# Patient Record
Sex: MALE
[De-identification: names, ages, dates, MRNs, and addresses within clinical notes are randomized per-mention and may not be internally consistent; named-entity substitution may affect disease eponyms.]

---

## 2017-12-20 ENCOUNTER — HOSPITAL ENCOUNTER (EMERGENCY)
Dept: HOSPITAL 14 - H.ER | Age: 47
Discharge: HOME | End: 2017-12-20
Payer: MEDICAID

## 2017-12-20 VITALS
TEMPERATURE: 97.5 F | SYSTOLIC BLOOD PRESSURE: 127 MMHG | RESPIRATION RATE: 16 BRPM | OXYGEN SATURATION: 99 % | DIASTOLIC BLOOD PRESSURE: 80 MMHG | HEART RATE: 79 BPM

## 2017-12-20 VITALS — BODY MASS INDEX: 29.9 KG/M2

## 2017-12-20 DIAGNOSIS — I12.9: ICD-10-CM

## 2017-12-20 DIAGNOSIS — F32.9: ICD-10-CM

## 2017-12-20 DIAGNOSIS — M25.562: Primary | ICD-10-CM

## 2017-12-20 DIAGNOSIS — Z88.0: ICD-10-CM

## 2017-12-20 DIAGNOSIS — F41.9: ICD-10-CM

## 2017-12-20 DIAGNOSIS — Z87.442: ICD-10-CM

## 2017-12-20 PROCEDURE — 73562 X-RAY EXAM OF KNEE 3: CPT

## 2017-12-20 PROCEDURE — 29530 STRAPPING OF KNEE: CPT

## 2017-12-20 PROCEDURE — 96372 THER/PROPH/DIAG INJ SC/IM: CPT

## 2017-12-20 PROCEDURE — 99283 EMERGENCY DEPT VISIT LOW MDM: CPT

## 2017-12-20 NOTE — RAD
PROCEDURE:  Left Knee Radiographs.



HISTORY:

Pain.



COMPARISON:

None.



FINDINGS:



BONES:

Normal. No fracture. 



JOINTS:

Normal. No osteoarthritis. 



JOINT EFFUSION:

None. 



OTHER FINDINGS:

None.



IMPRESSION:

Normal radiographs of the left knee.

## 2017-12-20 NOTE — ED PDOC
Lower Extremity Pain/Injury


Time Seen by Provider: 12/20/17 15:56


Chief Complaint (Nursing): Lower Extremity Problem/Injury


Chief Complaint (Provider): Left knee pain


History Per: Patient


History/Exam Limitations: no limitations


Current Symptoms Are (Timing): Still Present


Additional Complaint(s): 





46 y/o male presents to the emergency department with left knee pain that 

worsens with bending of the pain for 3 days. Reports pain improves when he leg 

is straight. States he took Ibuprofen 800 mg and had not experienced similar 

symptoms in the past. Denies any fall or injury. 





PMD: Dr. Sriram Ny MD





Past Medical History


Reviewed: Historical Data, Nursing Documentation, Vital Signs


Vital Signs: 





 Last Vital Signs











Temp  97.5 F L  12/20/17 15:43


 


Pulse  79   12/20/17 15:43


 


Resp  16   12/20/17 15:43


 


BP  127/80   12/20/17 15:43


 


Pulse Ox  99   12/20/17 15:43














- Medical History


PMH: Anxiety, Back Problems, Depression, Gastritis, HTN, Kidney Stones, Chronic 

Kidney Disease





- Surgical History


Other surgeries: Kindey surgery





- Family History


Family History: States: Unknown Family Hx





- Social History


Current smoker - smoking cessation education provided: No


Alcohol: None


Drugs: Denies





- Home Medications


Home Medications: 


 Ambulatory Orders











 Medication  Instructions  Recorded


 


Alprazolam 0.5 mg PO DAILY 06/12/15


 


Atenolol 25 mg PO DAILY 06/12/15


 


Multivitamin [Multiple Vitamins 1 tab PO DAILY 06/12/15





For Kids]  


 


Vortioxetine Hydrobromide 10 mg PO DAILY 06/15/15





[Brintellix]  


 


Cyclobenzaprine HCl [Flexeril] 10 mg PO BID PRN #12 tab 10/18/15


 


Ibuprofen [Motrin Tab] 800 mg PO Q6H PRN #20 tab 10/18/15


 


Ibuprofen [Motrin Tab] 800 mg PO Q8 PRN #20 tab 12/20/17














- Allergies


Allergies/Adverse Reactions: 


 Allergies











Allergy/AdvReac Type Severity Reaction Status Date / Time


 


Penicillins Allergy  RASH Verified 12/20/17 15:43














Review of Systems


ROS Statement: Except As Marked, All Systems Reviewed And Found Negative (As 

per HPI, otherwise negative)


Constitutional: Negative for: Other (No fall or injury)


Musculoskeletal: Positive for: Other (Left knee pain and swelling)





Physical Exam





- Reviewed


Nursing Documentation Reviewed: Yes


Vital Signs Reviewed: Yes





- Physical Exam


Appears: Positive for: Non-toxic, No Acute Distress


Head Exam: Positive for: ATRAUMATIC, NORMAL INSPECTION, NORMOCEPHALIC


Skin: Positive for: Normal Color, Warm, Dry


Extremity: Positive for: Normal ROM (Full range of motion of the left knee), 

Tenderness (to the left patella region), Swelling (Localized swelling to the 

left patella region).  Negative for: Other (No warmth or evidence of possible 

infection. )


Neurologic/Psych: Positive for: Alert, Oriented (x3)





- ECG


O2 Sat by Pulse Oximetry: 99 (RA)


Pulse Ox Interpretation: Normal





- Other Rad


  ** Left knee x-ray


X-Ray: Interpreted by Me, Viewed By Me


X-Ray Interpretation: no fx, no dis





Medical Decision Making


Medical Decision Making: 


Time: 1613





Initial impression: 46 y/o male with left knee pain 





Plan: 


X-ray left knee


PO motrin





Patient is aware of x-ray results. He reports improvement of pain after 

medication administered. Patient was given knee immobilizer, crutches declined. 

Prescription provided for Motrin, orthopedic referral also provided for follow-

up.





Scribe Attestation:


Documented by Krystle Colin, acting as a scribe for Brigitte Calderon PA-C





Provider Scribe Attestation:


All medical record entries made by the Scribe were at my direction and 

personally dictated by me. I have reviewed the chart and agree that the record 

accurately reflects my personal performance of the history, physical exam, 

medical decision making, and the department course for this patient. I have 

also personally directed, reviewed, and agree with the discharge instructions 

and disposition.





Procedures





- Splinting


Location: left knee


Pre-Made Type: knee immobilizer


Pre-Proc Neuro Vasc Exam: normal


Post-Proc Neuro Vasc Exam: normal





Disposition





- Clinical Impression


Clinical Impression: 


 Knee pain








- Patient ED Disposition


Is Patient to be Admitted: No


Counseled Patient/Family Regarding: Studies Performed, Diagnosis, Need For 

Followup, Rx Given





- Disposition


Referrals: 


Kg Hazel III, MD [Staff Provider] - 


Disposition: Routine/Home


Disposition Time: 17:34


Condition: STABLE


Additional Instructions: 


Ice and elevate affected area. Take prescription meds as directed as needed for 

pain. Follow-up with orthopedist or primary doctor for any persistent symptoms.


Prescriptions: 


Ibuprofen [Motrin Tab] 800 mg PO Q8 PRN #20 tab


 PRN Reason: Pain, Moderate (4-7)


Instructions:  Knee Pain (ED), Knee Immobilizer (ED)


Forms:  [facility] (English)

## 2018-01-15 ENCOUNTER — HOSPITAL ENCOUNTER (EMERGENCY)
Dept: HOSPITAL 14 - H.ER | Age: 48
Discharge: HOME | End: 2018-01-15
Payer: MEDICAID

## 2018-01-15 VITALS
RESPIRATION RATE: 18 BRPM | DIASTOLIC BLOOD PRESSURE: 82 MMHG | TEMPERATURE: 98.1 F | HEART RATE: 79 BPM | OXYGEN SATURATION: 100 % | SYSTOLIC BLOOD PRESSURE: 135 MMHG

## 2018-01-15 VITALS — BODY MASS INDEX: 29.9 KG/M2

## 2018-01-15 DIAGNOSIS — Z88.0: ICD-10-CM

## 2018-01-15 DIAGNOSIS — L50.0: ICD-10-CM

## 2018-01-15 DIAGNOSIS — T78.40XA: Primary | ICD-10-CM

## 2018-01-15 PROCEDURE — 96375 TX/PRO/DX INJ NEW DRUG ADDON: CPT

## 2018-01-15 PROCEDURE — 99282 EMERGENCY DEPT VISIT SF MDM: CPT

## 2018-01-15 PROCEDURE — 96374 THER/PROPH/DIAG INJ IV PUSH: CPT
